# Patient Record
Sex: MALE
[De-identification: names, ages, dates, MRNs, and addresses within clinical notes are randomized per-mention and may not be internally consistent; named-entity substitution may affect disease eponyms.]

---

## 2024-03-29 ENCOUNTER — APPOINTMENT (OUTPATIENT)
Dept: DERMATOLOGY | Facility: CLINIC | Age: 34
End: 2024-03-29
Payer: COMMERCIAL

## 2024-03-29 ENCOUNTER — NON-APPOINTMENT (OUTPATIENT)
Age: 34
End: 2024-03-29

## 2024-03-29 DIAGNOSIS — D36.9 BENIGN NEOPLASM, UNSPECIFIED SITE: ICD-10-CM

## 2024-03-29 DIAGNOSIS — L72.3 SEBACEOUS CYST: ICD-10-CM

## 2024-03-29 DIAGNOSIS — L82.1 OTHER SEBORRHEIC KERATOSIS: ICD-10-CM

## 2024-03-29 PROCEDURE — 10060 I&D ABSCESS SIMPLE/SINGLE: CPT

## 2024-03-29 PROCEDURE — 99203 OFFICE O/P NEW LOW 30 MIN: CPT | Mod: 25

## 2024-03-29 NOTE — PHYSICAL EXAM
[FreeTextEntry3] : Mobile subcutaneous nodule with normal overlying skin and a connecting pore  Small Left upper back  inflamed large milium near left medial canthus  Scaling waxy stuck on papule; R upper buttock

## 2024-03-29 NOTE — ASSESSMENT
[FreeTextEntry1] : cyst Left upper back Benign, small No treatment is required unless this lesion becomes symptomatic.  inflamed milium near L inner canthus removed by I&D  Benign SK R buttock;     Therapeutic options and their risks and benefits; along with multiple diagnostic possibilities were discussed at length; risks and benefits of further study were discussed;  Discussed annual TBSE;  Follow up for TBSE in 1 year

## 2025-04-04 ENCOUNTER — APPOINTMENT (OUTPATIENT)
Dept: DERMATOLOGY | Facility: CLINIC | Age: 35
End: 2025-04-04